# Patient Record
Sex: FEMALE | Race: WHITE | NOT HISPANIC OR LATINO
[De-identification: names, ages, dates, MRNs, and addresses within clinical notes are randomized per-mention and may not be internally consistent; named-entity substitution may affect disease eponyms.]

---

## 2021-01-25 PROBLEM — Z00.00 ENCOUNTER FOR PREVENTIVE HEALTH EXAMINATION: Status: ACTIVE | Noted: 2021-01-25

## 2021-03-06 ENCOUNTER — APPOINTMENT (OUTPATIENT)
Dept: PEDIATRICS | Facility: CLINIC | Age: 31
End: 2021-03-06
Payer: COMMERCIAL

## 2021-03-06 ENCOUNTER — TRANSCRIPTION ENCOUNTER (OUTPATIENT)
Age: 31
End: 2021-03-06

## 2021-03-06 DIAGNOSIS — Z71.89 OTHER SPECIFIED COUNSELING: ICD-10-CM

## 2021-03-06 PROCEDURE — 99072 ADDL SUPL MATRL&STAF TM PHE: CPT

## 2021-03-06 PROCEDURE — 99214 OFFICE O/P EST MOD 30 MIN: CPT

## 2021-08-19 ENCOUNTER — LABORATORY RESULT (OUTPATIENT)
Age: 31
End: 2021-08-19

## 2021-08-19 ENCOUNTER — APPOINTMENT (OUTPATIENT)
Dept: DERMATOLOGY | Facility: CLINIC | Age: 31
End: 2021-08-19
Payer: COMMERCIAL

## 2021-08-19 DIAGNOSIS — L01.00 IMPETIGO, UNSPECIFIED: ICD-10-CM

## 2021-08-19 PROCEDURE — 99204 OFFICE O/P NEW MOD 45 MIN: CPT | Mod: GC

## 2021-08-19 RX ORDER — DOXYCYCLINE HYCLATE 100 MG/1
100 TABLET ORAL TWICE DAILY
Qty: 14 | Refills: 0 | Status: ACTIVE | COMMUNITY
Start: 2021-08-19 | End: 1900-01-01

## 2021-08-19 NOTE — END OF VISIT
The patient is at high risk for a choroidal neovascular membrane. NO CNV SEEN TODAY. Dry ARMD is responsible for some decrease in vision. [] : Resident

## 2021-08-20 ENCOUNTER — NON-APPOINTMENT (OUTPATIENT)
Age: 31
End: 2021-08-20

## 2021-08-20 NOTE — PHYSICAL EXAM
[Alert] : alert [Oriented x 3] : ~L oriented x 3 [Well Nourished] : well nourished [Conjunctiva Non-injected] : conjunctiva non-injected [No Visual Lymphadenopathy] : no visual  lymphadenopathy [No Clubbing] : no clubbing [No Edema] : no edema [No Bromhidrosis] : no bromhidrosis [No Chromhidrosis] : no chromhidrosis [FreeTextEntry3] : - R thigh with pustule with surrounding circular erythema, and linear tracking of erythema superior to lesion \par - L thigh with hyperpigmented macule

## 2021-08-20 NOTE — ASSESSMENT
[FreeTextEntry1] : 1. dermatitis - arthropod bite reaction vs impetigo vs other \par - of note prior similar lesion resolved with antibiotic course, though this lesion appears to have tracking along lymphatics \par - bacterial culture taken from unroofed pustule today \par - continue with mupirocin ointment TID to AA, SED \par - doxycycline 100 mg BID x 7 days\par - We have discussed proper use and possible associated adverse effects, including GI distress, photosensitivity, and hypersensitivity reaction\par - if not resolving, should return to primary care physician \par - mupirocin ointment to nares for first 7 days of the next 3 months for decolonization \par

## 2021-08-20 NOTE — HISTORY OF PRESENT ILLNESS
[FreeTextEntry1] : np - bump [de-identified] : 30 yo F presenting for: \par \par 1. bump of the R thigh x days \par - started 1 day ago, has since gotten more swollen, with swelling tracking up the leg \par -  no fevers/chills, no recent illnesses, may have had a bug bite to the area \par - had a similar lesion of the L thigh that resolved with course of doxycycline \par - has also been using mupirocin to the spot as well as triamcinolone

## 2021-08-24 ENCOUNTER — NON-APPOINTMENT (OUTPATIENT)
Age: 31
End: 2021-08-24

## 2021-09-08 ENCOUNTER — APPOINTMENT (OUTPATIENT)
Dept: INFECTIOUS DISEASE | Facility: CLINIC | Age: 31
End: 2021-09-08
Payer: COMMERCIAL

## 2021-09-08 ENCOUNTER — NON-APPOINTMENT (OUTPATIENT)
Age: 31
End: 2021-09-08

## 2021-09-08 VITALS
SYSTOLIC BLOOD PRESSURE: 116 MMHG | WEIGHT: 137 LBS | OXYGEN SATURATION: 100 % | DIASTOLIC BLOOD PRESSURE: 81 MMHG | HEART RATE: 81 BPM | RESPIRATION RATE: 14 BRPM | TEMPERATURE: 99 F

## 2021-09-08 DIAGNOSIS — Z78.9 OTHER SPECIFIED HEALTH STATUS: ICD-10-CM

## 2021-09-08 DIAGNOSIS — Z86.39 PERSONAL HISTORY OF OTHER ENDOCRINE, NUTRITIONAL AND METABOLIC DISEASE: ICD-10-CM

## 2021-09-08 DIAGNOSIS — R76.8 OTHER SPECIFIED ABNORMAL IMMUNOLOGICAL FINDINGS IN SERUM: ICD-10-CM

## 2021-09-08 DIAGNOSIS — L30.9 DERMATITIS, UNSPECIFIED: ICD-10-CM

## 2021-09-08 PROCEDURE — 99204 OFFICE O/P NEW MOD 45 MIN: CPT

## 2021-09-08 RX ORDER — LINEZOLID 600 MG/1
600 TABLET, FILM COATED ORAL
Qty: 20 | Refills: 0 | Status: DISCONTINUED | COMMUNITY
Start: 2021-09-08 | End: 2021-09-08

## 2021-09-08 NOTE — HISTORY OF PRESENT ILLNESS
[FreeTextEntry1] : 31 year old female with left knee pain. \par \par She had two bug bites to her leg.   On July 31st, she had a bug bite. \par \par SHe had travelled to [place], [place] at the end on July. \par \par On 8/1, she was prescribed a 7 day course of doxycycline by urgent care. \par \par On 8/19, she had a bite to her right leg.   A culture at that time was positive for Staph. \par She was given another course of doxycycline- she took doxycycline 8/20 for 7 days. \par \par She has not had any episodes of fever. \par \par She saw her pmd on 8/27 with 3 days of knee pain. \par \par She had a blood test for lyme that was "positive".\par She was treated again with doxycyline for 10 days on 9/3. \par \par Past Medical History: \par hyperlipidemia\par \par Past Surgical History:\par none\par \par SoHx: \par One daughter- breastfeeding\par  for visual effects\par travel to [place], [place], [facility]/ [facility] and [facility])\par From [place]\par Lives in [place]\par \par FamHx: \par mother / father alive and healthy\par \par \par

## 2021-09-08 NOTE — PHYSICAL EXAM
[General Appearance - Alert] : alert [General Appearance - In No Acute Distress] : in no acute distress [Sclera] : the sclera and conjunctiva were normal [Outer Ear] : the ears and nose were normal in appearance [Neck Appearance] : the appearance of the neck was normal [Neck Cervical Mass (___cm)] : no neck mass was observed [Exaggerated Use Of Accessory Muscles For Inspiration] : no accessory muscle use [Heart Rate And Rhythm] : heart rate was normal and rhythm regular [Heart Sounds] : normal S1 and S2 [Edema] : there was no peripheral edema [Bowel Sounds] : normal bowel sounds [Abdomen Soft] : soft [No Palpable Adenopathy] : no palpable adenopathy [Musculoskeletal - Swelling] : no joint swelling [Skin Color & Pigmentation] : normal skin color and pigmentation [] : no rash [Oriented To Time, Place, And Person] : oriented to person, place, and time [Affect] : the affect was normal

## 2021-09-08 NOTE — ASSESSMENT
[FreeTextEntry1] : 31 year old female with \par \par 1) Recurrent staph aureus infection\par No staph aureus infection at this time\par \par reasonable to try decolonization with nasal mupirocin and Hibiclens\par \par 2) Knee pain\par worse with walking down stairs\par No joint effusion\par Doubt infectious process\par \par 3) Positive lyme test\par Patient did not have lyme antibody testing\par Patient had one lyme gM band- need two IgM bands for a positive IgM test\par Had one IgG band- need 5 for a positive\par \par Patient's presentation is not suggestive of lyme and lyme testing was negative\par \par Discussed tick bite prevention and importance of tick checks when in an endemic area\par \par

## 2021-11-12 ENCOUNTER — TRANSCRIPTION ENCOUNTER (OUTPATIENT)
Age: 31
End: 2021-11-12